# Patient Record
Sex: MALE | Race: WHITE | Employment: UNEMPLOYED | URBAN - METROPOLITAN AREA
[De-identification: names, ages, dates, MRNs, and addresses within clinical notes are randomized per-mention and may not be internally consistent; named-entity substitution may affect disease eponyms.]

---

## 2022-04-16 ENCOUNTER — HOSPITAL ENCOUNTER (EMERGENCY)
Age: 62
Discharge: HOME | End: 2022-04-16
Attending: STUDENT IN AN ORGANIZED HEALTH CARE EDUCATION/TRAINING PROGRAM
Payer: MEDICAID

## 2022-04-16 VITALS
OXYGEN SATURATION: 95 % | HEART RATE: 80 BPM | TEMPERATURE: 98.6 F | DIASTOLIC BLOOD PRESSURE: 57 MMHG | SYSTOLIC BLOOD PRESSURE: 102 MMHG | RESPIRATION RATE: 16 BRPM

## 2022-04-16 DIAGNOSIS — T40.601A OPIATE OVERDOSE, ACCIDENTAL OR UNINTENTIONAL, INITIAL ENCOUNTER (HCC): Primary | ICD-10-CM

## 2022-04-16 PROCEDURE — 99283 EMERGENCY DEPT VISIT LOW MDM: CPT

## 2022-04-16 PROCEDURE — 2580000003 HC RX 258: Performed by: STUDENT IN AN ORGANIZED HEALTH CARE EDUCATION/TRAINING PROGRAM

## 2022-04-16 PROCEDURE — 93005 ELECTROCARDIOGRAM TRACING: CPT | Performed by: STUDENT IN AN ORGANIZED HEALTH CARE EDUCATION/TRAINING PROGRAM

## 2022-04-16 RX ORDER — 0.9 % SODIUM CHLORIDE 0.9 %
1000 INTRAVENOUS SOLUTION INTRAVENOUS ONCE
Status: COMPLETED | OUTPATIENT
Start: 2022-04-16 | End: 2022-04-16

## 2022-04-16 RX ORDER — NALOXONE HYDROCHLORIDE 4 MG/.1ML
1 SPRAY NASAL PRN
Status: DISCONTINUED | OUTPATIENT
Start: 2022-04-16 | End: 2022-04-16 | Stop reason: HOSPADM

## 2022-04-16 RX ADMIN — SODIUM CHLORIDE 1000 ML: 9 INJECTION, SOLUTION INTRAVENOUS at 15:29

## 2022-04-16 NOTE — ED PROVIDER NOTES
[address] [facility] 71 ENCOUNTER          ATTENDING PHYSICIAN NOTE       Date of evaluation: 4/16/2022    Chief Complaint     Chief Complaint   Patient presents with    Drug Overdose     patient snorted what he thought was fentanyl, recieved 2mg narcan nasal         History of Present Illness     HPI   Krista Prince is a 65 y.o. male with no known past medical history (though does not have a PCP and does not know the last time he saw a physician for routine health maintenance), who presents to the ED for evaluation after an accidental overdose of a \"line of fentanyl\" that he snorted sometime between 1200 and 1300 hours today, then apparently \"passed out\" in [facility]. He arrived by EMS/squad and was given two doses of narcan, per the triage information. The patient states that he remembers snorting this line, then says that he was in line to purchase something at [employer], and the next thing he remembers was the paramedics \"being there. \"  He denies any pain, denies hitting his head. He denies any current palpitations, shortness of breath, chest pain, nausea. He denies any recent illness. He denies any suicidal ideations, he reports this was unintentional.    Review of Systems     Review of Systems  All other ROS negative except as indicated above in HPI. Past Medical, Surgical, Family, and Social History     History reviewed. No pertinent past medical history. History reviewed. No pertinent surgical history. History reviewed. No pertinent family history. Social History     Tobacco Use    Smoking status: Current Every Day Smoker     Packs/day: 1.00     Types: Cigarettes    Smokeless tobacco: Never Used   Substance Use Topics    Alcohol use: Not Currently    Drug use: Yes          Medications     Prior to Admission medications    Medication Sig Start Date End Date Taking?  Authorizing Provider   Naloxone HCl (NALOXONE OPIATE OVERDOSE KIT) 1 each by Nasal route once for 1 dose 4/16/22 4/16/22 Yes Carmelo Pereyra MD       Allergies     Allergies as of 04/16/2022    (No Known Allergies)        Physical Exam     ED Triage Vitals [04/16/22 1439]   Enc Vitals Group      BP 92/65      Pulse 80      Resp 16      Temp 98.6 °F (37 °C)      Temp Source Oral      SpO2 93 %       Physical Exam  GENERAL: 65y.o. year old male  Appears well-developed, well-nourished, in no obvious acute distress  Nursing notes reviewed  Vital signs reviewed   HEAD: Normocephalic  No obvious external signs of trauma to the scalp, skull, face, eyes ears nose or mouth. EYES:  Pupils are equal, round, reactive to light and are 2mm bilaterally   EOM intact  Conjunctivae normal, anicteric    ENT: External ears normal  Nose normal  Oropharynx is clear and oral mucosa is moist   CV: Regular rate and rhythm   PULMONARY:  Symmetric chest wall expansion  Effort normal  No accessory muscle use or other signs of respiratory distress  Bilateral breath sounds are clear and equal, no wheezes, rhonchi or rales   ABDOMINAL: Soft  Non-tender  Non-distended  No peritonitis  No guarding, rebound, or rigidity   MSK:  Freely moves all extremities without difficulty  Atraumatic   SKIN: Warm, pink and dry  No obvious rashes, acute lesions or open wounds   NEURO: Alert and oriented x4 though somewhat \"sleepy\"     PSYCH: Calm and cooperative. Mood and affect normal         Diagnostic Results     EKG   Interpreted by: Jaycee Turk. Pk Mendes MD    The EKG from this visit has been reviewed, and is notable for:   Sinus bradycardia without ectopy, with a ventricular rate of 57, and normal axis.  Normal OH and QRS intervals / OH: 142ms, QRS 92 ms   Prolonged QTc at 482 ms   No overt evidence of acute ischemia.  There are no overt repolarization abnormalities present.  No prior EKGs found on record for comparison. RADIOLOGY:  No orders to display       LABS:   No results found for this visit on 04/16/22.     RECENT VITALS:  BP: (!) 102/57,Temp: 98.6 °F (37 °C), Pulse: 80, Resp: 16, SpO2: 95 %     Procedures     ED BEDSIDE ULTRASOUND:  none    Procedures   none    ED Course          Key medications administered in the ED:  ED Medication Orders (From admission, onward)    Start Ordered     Status Ordering Provider    04/16/22 1756 04/16/22 1757  naloxone opiate overdose kit 4mg  PRN         Ordered QIAN FRANKS STEPHANIE    04/16/22 1515 04/16/22 1508  0.9 % sodium chloride bolus  ONCE         Last MAR action: Stopped - by Ranjit Wiley on 04/16/22 at 1736 [address]           CONSULTS:  None    MEDICAL DECISIONMAKING / ASSESSMENT / PLAN   I have reviewed and confirmed nursing documentation for the pertinent past medical history, family history, and social history. Ramy Patton is a 65 y.o. male with PMHx as above, who presented to the ED today after an accidental opiate overdose. The patient received Narcan in the field at approximately 1400 hrs. The patient denies any type of suicide attempt today or co-ingestion. Since the patient's arrival to the emergency department, patient has been alert, awake, and oriented with no evidence of depressed mental status or decreased respiratory drive. The patient has been satting well on room air and is otherwise hemodynamically stable with no evidence of systemic illness. The patient was observed in the ED for approximately 3 hours and we are comfortable discharging the patient as the Narcan has had enough time to metabolize without evidence of recurrent respiratory or mental status depression. They were counseled on the potential risks of IV drug use and were offered resources for rehab if they desired it. Patient ready for discharge. Patient was ambulating independently with a steady gait, without ataxia and speaking clearly. Additionally, patient was given a Narcan overdose kit to-go at time of discharge      Clinical Impression     1.  Opiate overdose, accidental or unintentional, initial encounter [facility])        Disposition     DISPOSITION:   Decision To Discharge 04/16/2022 05:49:45 PM      PATIENT REFERRED TO:  [address] [place] [address] [address]. [address]  [phone]    Call the above number to request new PCP      DISCHARGE MEDICATIONS:  Patient's Medications   New Prescriptions    NALOXONE HCL (NALOXONE OPIATE OVERDOSE KIT)    1 each by Nasal route once for 1 dose   Previous Medications    No medications on file   Modified Medications    No medications on file   Discontinued Medications    No medications on file        Jaswinder Vega MD  [facility]     Valdo Mckeon MD  04/16/22 5058

## 2022-04-17 LAB
EKG ATRIAL RATE: 57 BPM
EKG DIAGNOSIS: NORMAL
EKG P AXIS: 43 DEGREES
EKG P-R INTERVAL: 142 MS
EKG Q-T INTERVAL: 496 MS
EKG QRS DURATION: 92 MS
EKG QTC CALCULATION (BAZETT): 482 MS
EKG R AXIS: 42 DEGREES
EKG T AXIS: 21 DEGREES
EKG VENTRICULAR RATE: 57 BPM

## 2022-04-20 ENCOUNTER — TELEPHONE (OUTPATIENT)
Dept: INTERNAL MEDICINE CLINIC | Age: 62
End: 2022-04-20